# Patient Record
Sex: FEMALE | Race: ASIAN | NOT HISPANIC OR LATINO
[De-identification: names, ages, dates, MRNs, and addresses within clinical notes are randomized per-mention and may not be internally consistent; named-entity substitution may affect disease eponyms.]

---

## 2020-08-10 ENCOUNTER — ERX REFILL RESPONSE (OUTPATIENT)
Age: 55
End: 2020-08-10

## 2020-08-10 RX ORDER — OMEPRAZOLE 40 MG/1
TAKE ONE CAPSULE BY MOUTH TWICE A DAY CAPSULE, DELAYED RELEASE ORAL
Qty: 60 | Refills: 1

## 2020-09-16 ENCOUNTER — OFFICE VISIT (OUTPATIENT)
Dept: URBAN - METROPOLITAN AREA TELEHEALTH 2 | Facility: TELEHEALTH | Age: 55
End: 2020-09-16
Payer: COMMERCIAL

## 2020-09-16 ENCOUNTER — TELEPHONE ENCOUNTER (OUTPATIENT)
Dept: URBAN - METROPOLITAN AREA CLINIC 92 | Facility: CLINIC | Age: 55
End: 2020-09-16

## 2020-09-16 DIAGNOSIS — R63.0 ANOREXIA: ICD-10-CM

## 2020-09-16 DIAGNOSIS — R11.0 NAUSEA: ICD-10-CM

## 2020-09-16 DIAGNOSIS — R12 HEARTBURN: ICD-10-CM

## 2020-09-16 DIAGNOSIS — R10.84 ABDOMINAL PAIN, GENERALIZED: ICD-10-CM

## 2020-09-16 PROBLEM — 79890006 ANOREXIA: Status: ACTIVE | Noted: 2020-09-16

## 2020-09-16 PROCEDURE — 99214 OFFICE O/P EST MOD 30 MIN: CPT | Performed by: INTERNAL MEDICINE

## 2020-09-16 PROCEDURE — G8427 DOCREV CUR MEDS BY ELIG CLIN: HCPCS | Performed by: INTERNAL MEDICINE

## 2020-09-16 PROCEDURE — 1036F TOBACCO NON-USER: CPT | Performed by: INTERNAL MEDICINE

## 2020-09-16 PROCEDURE — 3017F COLORECTAL CA SCREEN DOC REV: CPT | Performed by: INTERNAL MEDICINE

## 2020-09-16 PROCEDURE — G9903 PT SCRN TBCO ID AS NON USER: HCPCS | Performed by: INTERNAL MEDICINE

## 2020-09-16 RX ORDER — OMEPRAZOLE 40 MG/1
TAKE ONE CAPSULE BY MOUTH TWICE A DAY CAPSULE, DELAYED RELEASE ORAL
Qty: 60 | Refills: 1 | Status: ACTIVE | COMMUNITY

## 2020-09-16 NOTE — HPI-TODAY'S VISIT:
Ms Gupta presents for a f/u visit. She complains of epigastric pain which is new. Describes this as a 'hunger' pain and burning sensation. She is on Omeprazole daily for heartburn but this doesn not help her pain. Also experiencing nausea and lack of appetite. She had reported some unintentional weight loss as well during her previous visit a few months ago but this has stabilized. No NSAID use. EGD 1 year ago showed gastritis. Colonoscopy in  was normal, next due in . Patient seen today via telehealth by agreement and consent of patient in light of current COVID-19 pandemic. I used video conferencing during the visit. The patient encounter is appropriate and reasonable under the circumstances given the patient's particular presentation at this time. The patient has been advised of the followin) the potential risks and limitations of this mode of treatment (including but not limited to the absence of in-person examination); 2) the right to refuse telehealth services at any point without affecting the right to future care; 3) the right to receive in-person services, included immediately after this consultation if an urgent need arises; 4) information, including identifiable images or information from this telehealth consult, will only be shared in accordance with HIPAA regulations. Any and all of the patient's and/or patient's family member's questions on this issue have been answered. The patient has verbally consented to be treated via telehealth services. The patient has also been advised to contact this office for worsening conditions or problems, and seek emergency medical treatment and/or call 911 if the patient deems either necessary.  More than half of the face-to-face time used for counseling and coordination of care.

## 2020-10-01 ENCOUNTER — TELEPHONE ENCOUNTER (OUTPATIENT)
Dept: URBAN - METROPOLITAN AREA CLINIC 92 | Facility: CLINIC | Age: 55
End: 2020-10-01

## 2020-10-01 ENCOUNTER — OFFICE VISIT (OUTPATIENT)
Dept: URBAN - METROPOLITAN AREA SURGERY CENTER 20 | Facility: SURGERY CENTER | Age: 55
End: 2020-10-01
Payer: COMMERCIAL

## 2020-10-01 DIAGNOSIS — K29.30 CHRONIC SUPERFICIAL GASTRITIS: ICD-10-CM

## 2020-10-01 PROCEDURE — G8907 PT DOC NO EVENTS ON DISCHARG: HCPCS | Performed by: INTERNAL MEDICINE

## 2020-10-01 PROCEDURE — 43239 EGD BIOPSY SINGLE/MULTIPLE: CPT | Performed by: INTERNAL MEDICINE

## 2020-10-01 RX ORDER — OMEPRAZOLE 40 MG/1
TAKE ONE CAPSULE BY MOUTH TWICE A DAY CAPSULE, DELAYED RELEASE ORAL
Qty: 60 | Refills: 1 | Status: ACTIVE | COMMUNITY

## 2020-10-08 ENCOUNTER — ERX REFILL RESPONSE (OUTPATIENT)
Age: 55
End: 2020-10-08

## 2020-10-08 RX ORDER — OMEPRAZOLE 40 MG/1
TAKE ONE CAPSULE BY MOUTH TWICE A DAY CAPSULE, DELAYED RELEASE ORAL
Qty: 60 | Refills: 2

## 2020-11-04 ENCOUNTER — LAB OUTSIDE AN ENCOUNTER (OUTPATIENT)
Dept: URBAN - METROPOLITAN AREA TELEHEALTH 2 | Facility: TELEHEALTH | Age: 55
End: 2020-11-04

## 2020-11-04 ENCOUNTER — OFFICE VISIT (OUTPATIENT)
Dept: URBAN - METROPOLITAN AREA TELEHEALTH 2 | Facility: TELEHEALTH | Age: 55
End: 2020-11-04
Payer: COMMERCIAL

## 2020-11-04 DIAGNOSIS — R10.13 DYSPEPSIA: ICD-10-CM

## 2020-11-04 DIAGNOSIS — R94.5 ABNORMAL LFTS: ICD-10-CM

## 2020-11-04 PROCEDURE — G8418 CALC BMI BLW LOW PARAM F/U: HCPCS | Performed by: INTERNAL MEDICINE

## 2020-11-04 PROCEDURE — 3017F COLORECTAL CA SCREEN DOC REV: CPT | Performed by: INTERNAL MEDICINE

## 2020-11-04 PROCEDURE — G9903 PT SCRN TBCO ID AS NON USER: HCPCS | Performed by: INTERNAL MEDICINE

## 2020-11-04 PROCEDURE — 1036F TOBACCO NON-USER: CPT | Performed by: INTERNAL MEDICINE

## 2020-11-04 PROCEDURE — 99214 OFFICE O/P EST MOD 30 MIN: CPT | Performed by: INTERNAL MEDICINE

## 2020-11-04 RX ORDER — OMEPRAZOLE 40 MG/1
TAKE ONE CAPSULE BY MOUTH TWICE A DAY CAPSULE, DELAYED RELEASE ORAL
Qty: 60 | Refills: 2 | Status: ACTIVE | COMMUNITY

## 2020-11-04 NOTE — HPI-TODAY'S VISIT:
Ms Gupta returns for a f/u visit. Reports improvement in her symptoms with Omeprazole daily. Recent CT and EGD were unremarkable.  Colonoscopy in 2019 was unremarkable. Next due in . Reports recent labs during a routine physical showed an ALT of 51.  Cholesterol levels were also mildly elevated.  No family history of liver disease. No recent new medications or supplements. No alcohol use. Patient seen today via telehealth by agreement and consent of patient in light of current COVID-19 pandemic. I used video conferencing during the visit. The patient encounter is appropriate and reasonable under the circumstances given the patient's particular presentation at this time. The patient has been advised of the followin) the potential risks and limitations of this mode of treatment (including but not limited to the absence of in-person examination); 2) the right to refuse telehealth services at any point without affecting the right to future care; 3) the right to receive in-person services, included immediately after this consultation if an urgent need arises; 4) information, including identifiable images or information from this telehealth consult, will only be shared in accordance with HIPAA regulations. Any and all of the patient's and/or patient's family member's questions on this issue have been answered. The patient has verbally consented to be treated via telehealth services. The patient has also been advised to contact this office for worsening conditions or problems, and seek emergency medical treatment and/or call 911 if the patient deems either necessary.  More than half of the face-to-face time used for counseling and coordination of care.

## 2022-01-04 ENCOUNTER — OFFICE VISIT (OUTPATIENT)
Dept: URBAN - METROPOLITAN AREA CLINIC 46 | Facility: CLINIC | Age: 57
End: 2022-01-04
Payer: COMMERCIAL

## 2022-01-04 VITALS
WEIGHT: 96.8 LBS | TEMPERATURE: 98.3 F | HEART RATE: 82 BPM | DIASTOLIC BLOOD PRESSURE: 81 MMHG | SYSTOLIC BLOOD PRESSURE: 141 MMHG | HEIGHT: 60 IN | BODY MASS INDEX: 19.01 KG/M2

## 2022-01-04 DIAGNOSIS — R10.13 DYSPEPSIA: ICD-10-CM

## 2022-01-04 PROCEDURE — 99204 OFFICE O/P NEW MOD 45 MIN: CPT | Performed by: INTERNAL MEDICINE

## 2022-01-04 RX ORDER — PANTOPRAZOLE SODIUM 40 MG/1
1 TABLET TABLET, DELAYED RELEASE ORAL ONCE A DAY
Qty: 90 | Refills: 3 | OUTPATIENT
Start: 2022-01-04

## 2022-01-04 RX ORDER — OMEPRAZOLE 40 MG/1
TAKE ONE CAPSULE BY MOUTH TWICE A DAY CAPSULE, DELAYED RELEASE ORAL
Qty: 60 | Refills: 2 | Status: DISCONTINUED | COMMUNITY

## 2022-01-04 RX ORDER — CIDER VINEGAR 300 MG
1 CAPSULE TABLET ORAL ONCE A DAY
Status: ACTIVE | COMMUNITY

## 2022-01-04 RX ORDER — OMEPRAZOLE 20 MG/1
1 CAPSULE 30 MINUTES BEFORE MORNING MEAL CAPSULE, DELAYED RELEASE ORAL ONCE A DAY
Status: ACTIVE | COMMUNITY

## 2022-01-04 RX ORDER — LISINOPRIL 2.5 MG/1
TAKE ONE TABLET BY MOUTH ONE TIME DAILY TABLET ORAL
Qty: 90 | Refills: 0 | Status: ACTIVE | COMMUNITY

## 2022-01-04 RX ORDER — ERGOCALCIFEROL (VITAMIN D2) 10 MCG
2 TABLETS TABLET ORAL ONCE A DAY
Status: ACTIVE | COMMUNITY

## 2022-01-04 RX ORDER — OMEPRAZOLE 20 MG/1
1 CAPSULE 30 MINUTES BEFORE MORNING MEAL CAPSULE, DELAYED RELEASE ORAL ONCE A DAY
OUTPATIENT

## 2022-01-04 RX ORDER — LATANOPROST 50 UG/ML
SOLUTION/ DROPS OPHTHALMIC
Qty: 7.5 | Status: ACTIVE | COMMUNITY

## 2022-01-04 NOTE — HPI-TODAY'S VISIT:
Pt with a hx of chronic epigastric pain with heartburn. Workup in 2019 by Dr. Burt with a negative EGD, colonoscopy and CT; started on Omeparzole with improvement in symptoms. Recurrent symptoms in 2020 and had a repat EGD and CT and negative per pt and took BID PPI for a short period of time and improved. 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 Since flare back to baseline where at times mild dyspepsia. No weight loss. No dysphagia. No fever or night sweats. No blood in the stool.

## 2022-03-08 ENCOUNTER — OFFICE VISIT (OUTPATIENT)
Dept: URBAN - METROPOLITAN AREA CLINIC 48 | Facility: CLINIC | Age: 57
End: 2022-03-08
Payer: COMMERCIAL

## 2022-03-08 VITALS
SYSTOLIC BLOOD PRESSURE: 136 MMHG | TEMPERATURE: 97.3 F | WEIGHT: 97 LBS | HEART RATE: 77 BPM | BODY MASS INDEX: 19.04 KG/M2 | HEIGHT: 60 IN | DIASTOLIC BLOOD PRESSURE: 73 MMHG

## 2022-03-08 DIAGNOSIS — R10.13 DYSPEPSIA: ICD-10-CM

## 2022-03-08 PROCEDURE — 99213 OFFICE O/P EST LOW 20 MIN: CPT | Performed by: INTERNAL MEDICINE

## 2022-03-08 PROCEDURE — 99213 OFFICE O/P EST LOW 20 MIN: CPT | Performed by: PHYSICIAN ASSISTANT

## 2022-03-08 RX ORDER — PANTOPRAZOLE SODIUM 40 MG/1
1 TABLET TABLET, DELAYED RELEASE ORAL ONCE A DAY
OUTPATIENT
Start: 2022-01-04

## 2022-03-08 RX ORDER — LISINOPRIL 2.5 MG/1
TAKE ONE TABLET BY MOUTH ONE TIME DAILY TABLET ORAL
Qty: 90 | Refills: 0 | Status: ACTIVE | COMMUNITY

## 2022-03-08 RX ORDER — PANTOPRAZOLE SODIUM 40 MG/1
1 TABLET TABLET, DELAYED RELEASE ORAL ONCE A DAY
Qty: 90 | Refills: 3 | Status: ACTIVE | COMMUNITY
Start: 2022-01-04

## 2022-03-08 NOTE — HPI-TODAY'S VISIT:
Pt presents for a 2 month f/u. She has a hx of chronic epigastric pain with heartburn. Workup in 2019 by Dr. Burt with a negative EGD, colonoscopy and CT; started on Omeparzole with improvement in symptoms. Recurrent symptoms in 2020 and had a repat EGD and CT and negative per pt and took BID PPI for a short period of time and improved. 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 No weight loss. No dysphagia. No fever or night sweats. No blood in the stool. She was changed from Omeprazole 20 mg to Pantoprazole 40 mg daily. She returns today and does feel significantly improved. She still has occasional dyspepsia with mild upper abdominal discomfort and "sour" feeling in her stomach, which usually occurs a few hours after eating. Certain foods do seem to be triggering. She denies any significant abdominal pain.

## 2022-09-12 ENCOUNTER — OFFICE VISIT (OUTPATIENT)
Dept: URBAN - METROPOLITAN AREA CLINIC 48 | Facility: CLINIC | Age: 57
End: 2022-09-12
Payer: COMMERCIAL

## 2022-09-12 VITALS
WEIGHT: 100.2 LBS | HEART RATE: 81 BPM | HEIGHT: 60 IN | BODY MASS INDEX: 19.67 KG/M2 | TEMPERATURE: 98.1 F | DIASTOLIC BLOOD PRESSURE: 74 MMHG | SYSTOLIC BLOOD PRESSURE: 123 MMHG

## 2022-09-12 DIAGNOSIS — R10.13 DYSPEPSIA: ICD-10-CM

## 2022-09-12 PROCEDURE — 99213 OFFICE O/P EST LOW 20 MIN: CPT | Performed by: INTERNAL MEDICINE

## 2022-09-12 RX ORDER — LISINOPRIL 2.5 MG/1
TAKE ONE TABLET BY MOUTH ONE TIME DAILY TABLET ORAL
Qty: 90 | Refills: 0 | Status: ACTIVE | COMMUNITY

## 2022-09-12 RX ORDER — PANTOPRAZOLE SODIUM 40 MG/1
1 TABLET TABLET, DELAYED RELEASE ORAL ONCE A DAY
Status: ACTIVE | COMMUNITY
Start: 2022-01-04

## 2022-09-12 NOTE — HPI-TODAY'S VISIT:
Pt presents for a 6 month f/u. She has a hx of chronic epigastric pain with heartburn. Workup in 2019 by Dr. Burt with a negative EGD, colonoscopy and CT; started on Omeprazole with improvement in symptoms. Recurrent symptoms in 2020 and had a repeat EGD and CT and negative per pt and took BID PPI for a short period of time and improved. 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 No weight loss. No dysphagia. No fever or night sweats. No blood in the stool. She was changed from Omeprazole 20 mg to Pantoprazole 40 mg daily.Last seen in March and feeling significantly improved. She still has occasional dyspepsia with mild upper abdominal discomfort and "sour" feeling in her stomach, which usually occurs a few hours after eating. Certain foods do seem to be triggering. She denies any significant abdominal pain. She returns today with no significant changes. Overall, she feels well, and occasionally has the "sour" stomach, still felt to be related to certain foods. She is still on Pantoprazole 40 mg daily and did not really try taking FDgard PRN. No new concerns. She is on a Calcium and Vit D supplement daily.

## 2022-12-27 ENCOUNTER — ERX REFILL RESPONSE (OUTPATIENT)
Dept: URBAN - METROPOLITAN AREA CLINIC 46 | Facility: CLINIC | Age: 57
End: 2022-12-27

## 2022-12-27 RX ORDER — PANTOPRAZOLE 40 MG/1
TAKE ONE TABLET BY MOUTH DAILY TABLET, DELAYED RELEASE ORAL ONCE A DAY
Qty: 90 TABLET | Refills: 0 | OUTPATIENT

## 2022-12-27 RX ORDER — PANTOPRAZOLE SODIUM 40 MG/1
1 TABLET TABLET, DELAYED RELEASE ORAL ONCE A DAY
Qty: 90 TABLET | Refills: 3 | OUTPATIENT

## 2023-03-13 ENCOUNTER — OFFICE VISIT (OUTPATIENT)
Dept: URBAN - METROPOLITAN AREA CLINIC 48 | Facility: CLINIC | Age: 58
End: 2023-03-13
Payer: COMMERCIAL

## 2023-03-13 VITALS
TEMPERATURE: 97.9 F | WEIGHT: 101 LBS | BODY MASS INDEX: 19.83 KG/M2 | SYSTOLIC BLOOD PRESSURE: 129 MMHG | OXYGEN SATURATION: 98 % | HEART RATE: 74 BPM | HEIGHT: 60 IN | DIASTOLIC BLOOD PRESSURE: 83 MMHG

## 2023-03-13 DIAGNOSIS — R10.13 DYSPEPSIA: ICD-10-CM

## 2023-03-13 PROCEDURE — 99213 OFFICE O/P EST LOW 20 MIN: CPT | Performed by: INTERNAL MEDICINE

## 2023-03-13 RX ORDER — CALCIUM CARBONATE/VITAMIN D3 500-10/5ML
1 CAPSULE LIQUID (ML) ORAL ONCE A DAY
Status: ACTIVE | COMMUNITY

## 2023-03-13 RX ORDER — LISINOPRIL 2.5 MG/1
TAKE ONE TABLET BY MOUTH ONE TIME DAILY TABLET ORAL
Qty: 90 | Refills: 0 | Status: ACTIVE | COMMUNITY

## 2023-03-13 RX ORDER — CYANOCOBALAMIN (VITAMIN B-12) 500 MCG
1 CAPSULE TABLET ORAL TWICE A DAY
Status: ACTIVE | COMMUNITY

## 2023-03-13 RX ORDER — PANTOPRAZOLE SODIUM 40 MG/1
1 TABLET TABLET, DELAYED RELEASE ORAL ONCE A DAY
Qty: 90 TABLET | Refills: 3 | Status: ACTIVE | COMMUNITY

## 2023-03-13 NOTE — HPI-TODAY'S VISIT:
Pt presents for a 6 month f/u on chronic dyspepsia. Workup in 2019 by Dr. Burt with a negative EGD, colonoscopy and CT; started on Omeprazole with improvement in symptoms. Recurrent symptoms in 2020 and had a repeat EGD and CT and negative per pt and took BID PPI for a short period of time and improved. 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 She was changed from Omeprazole 20 mg to Pantoprazole 40 mg daily. Followed up in March 2022 and was significantly improved; still with occasional dyspepsia with mild upper abdominal discomfort and "sour" feeling in her stomach, which usually occurs a few hours after eating. Followed up again 9/2022 with no reported change; still on Pantoprazole 40 mg daily as well as Calcium and Vit D supplement daily.  Returns today 3/13/23 and no significant change. She has been taking Pantoprazole 40 mg qod and avoiding triggering foods (i.e. mentions had gas/bloating/sour stomach after eating cabbage recently). Certain foods also result in belching after meals. She tries to eat small portion sizes. Denies early satiety, N/V. Appetite is good and weight is stable.  She is not diabetic and does not take pain medication. She has an upcoming physical with her PCP.

## 2024-03-04 ENCOUNTER — OV EP (OUTPATIENT)
Dept: URBAN - METROPOLITAN AREA CLINIC 48 | Facility: CLINIC | Age: 59
End: 2024-03-04
Payer: COMMERCIAL

## 2024-03-04 VITALS
DIASTOLIC BLOOD PRESSURE: 77 MMHG | HEIGHT: 60 IN | HEART RATE: 73 BPM | BODY MASS INDEX: 19.44 KG/M2 | WEIGHT: 99 LBS | TEMPERATURE: 97.7 F | SYSTOLIC BLOOD PRESSURE: 121 MMHG | OXYGEN SATURATION: 99 %

## 2024-03-04 DIAGNOSIS — R10.13 DYSPEPSIA: ICD-10-CM

## 2024-03-04 PROCEDURE — 99213 OFFICE O/P EST LOW 20 MIN: CPT | Performed by: INTERNAL MEDICINE

## 2024-03-04 RX ORDER — CALCIUM CARBONATE/VITAMIN D3 500-10/5ML
1 CAPSULE LIQUID (ML) ORAL ONCE A DAY
Status: ACTIVE | COMMUNITY

## 2024-03-04 RX ORDER — CYANOCOBALAMIN (VITAMIN B-12) 500 MCG
1 CAPSULE TABLET ORAL TWICE A DAY
Status: ACTIVE | COMMUNITY

## 2024-03-04 RX ORDER — PANTOPRAZOLE SODIUM 40 MG/1
1 TABLET TABLET, DELAYED RELEASE ORAL ONCE A DAY
Qty: 30 | Refills: 6

## 2024-03-04 RX ORDER — LISINOPRIL 2.5 MG/1
TAKE ONE TABLET BY MOUTH ONE TIME DAILY TABLET ORAL
Qty: 90 | Refills: 0 | Status: ACTIVE | COMMUNITY

## 2024-03-04 RX ORDER — PANTOPRAZOLE SODIUM 40 MG/1
1 TABLET TABLET, DELAYED RELEASE ORAL ONCE A DAY
Qty: 90 TABLET | Refills: 3 | Status: ACTIVE | COMMUNITY

## 2024-03-04 NOTE — PHYSICAL EXAM HENT:
Head, normocephalic, atraumatic, Face, Face within normal limits, Ears, External ears within normal limits 4 (moderate pain)

## 2024-03-04 NOTE — HPI-TODAY'S VISIT:
Pt presents for a 6 month f/u on chronic dyspepsia. Workup in 2019 by Dr. Burt with a negative EGD, colonoscopy and CT; started on Omeprazole with improvement in symptoms. Recurrent symptoms in 2020 and had a repeat EGD and CT and negative per pt and took BID PPI for a short period of time and improved. 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 She was changed from Omeprazole 20 mg to Pantoprazole 40 mg daily. Followed up in March 2022 and was significantly improved; still with occasional dyspepsia with mild upper abdominal discomfort and "sour" feeling in her stomach, which usually occurs a few hours after eating. Followed up again 9/2022 with no reported change; still on Pantoprazole 40 mg daily as well as Calcium and Vit D supplement daily.  Returns today 3/13/23 and no significant change. She has been taking Pantoprazole 40 mg qod and avoiding triggering foods (i.e. mentions had gas/bloating/sour stomach after eating cabbage recently). Certain foods also result in belching after meals. She tries to eat small portion sizes. Denies early satiety, N/V. Appetite is good and weight is stable.  She is not diabetic and does not take pain medication. She has an upcoming physical with her PCP. We discussed long term PPI use. We discussed continuing PPI at lowest effective amount, FDgard and/or gasex PRN; NM GE study if symptoms were to worsen.  3/4/24 for annual follow up: She has done very well with minimal episodes of dyspepsia over the last year. However, she has had some upper abdominal pain in the last 3 days; unsure if something she ate triggered this. She gets relief if she belches. Denies NSAID use. She has been taking Pantoprazole 40 mg once every 5 days. She has had improvement with PRN FDgard. No other new concerns.

## 2025-02-26 ENCOUNTER — OFFICE VISIT (OUTPATIENT)
Dept: URBAN - METROPOLITAN AREA CLINIC 48 | Facility: CLINIC | Age: 60
End: 2025-02-26

## 2025-03-04 ENCOUNTER — OFFICE VISIT (OUTPATIENT)
Dept: URBAN - METROPOLITAN AREA CLINIC 48 | Facility: CLINIC | Age: 60
End: 2025-03-04

## 2025-03-04 ENCOUNTER — DASHBOARD ENCOUNTERS (OUTPATIENT)
Age: 60
End: 2025-03-04

## 2025-03-10 ENCOUNTER — OFFICE VISIT (OUTPATIENT)
Dept: URBAN - METROPOLITAN AREA CLINIC 48 | Facility: CLINIC | Age: 60
End: 2025-03-10
Payer: COMMERCIAL

## 2025-03-10 VITALS
BODY MASS INDEX: 20.38 KG/M2 | TEMPERATURE: 97 F | DIASTOLIC BLOOD PRESSURE: 80 MMHG | WEIGHT: 103.8 LBS | SYSTOLIC BLOOD PRESSURE: 177 MMHG | HEIGHT: 60 IN | HEART RATE: 71 BPM

## 2025-03-10 DIAGNOSIS — R10.13 DYSPEPSIA: ICD-10-CM

## 2025-03-10 PROCEDURE — 99213 OFFICE O/P EST LOW 20 MIN: CPT | Performed by: PHYSICIAN ASSISTANT

## 2025-03-10 RX ORDER — PANTOPRAZOLE SODIUM 40 MG/1
1 TABLET TABLET, DELAYED RELEASE ORAL ONCE A DAY
Qty: 90 TABLET | Refills: 3

## 2025-03-10 RX ORDER — CYANOCOBALAMIN (VITAMIN B-12) 500 MCG
1 CAPSULE TABLET ORAL TWICE A DAY
Status: ACTIVE | COMMUNITY

## 2025-03-10 RX ORDER — CALCIUM CARBONATE/VITAMIN D3 500-10/5ML
1 CAPSULE LIQUID (ML) ORAL ONCE A DAY
Status: ACTIVE | COMMUNITY

## 2025-03-10 RX ORDER — LISINOPRIL 2.5 MG/1
1 TABLET TABLET ORAL ONCE A DAY
Refills: 0 | Status: ACTIVE | COMMUNITY

## 2025-03-10 RX ORDER — PANTOPRAZOLE SODIUM 40 MG/1
1 TABLET TABLET, DELAYED RELEASE ORAL ONCE A DAY
Qty: 30 | Refills: 6 | Status: ACTIVE | COMMUNITY

## 2025-03-10 NOTE — HPI-TODAY'S VISIT:
Pt presents for an annual f/u on chronic dyspepsia. Workup in 2019 by Dr. Burt with a negative EGD (unremarkable esophageal, gastric SB Bx), negative colonoscopy (no family h/o CRC or polyps) and negative CT; started on Omeprazole with improvement in symptoms. Recurrent symptoms in 2020 and had a repeat EGD and CT and negative per pt and took BID PPI for a short period of time and improved. She previously took Omeprazole, and had overall improvement when changed to Pantoprazole. When seen last year, she was taking this 5 days per week with stable symptoms. Today, she reports she is only needing to take it about once a week and doing well. She feels certain foods bring on her epigastric discomfort. She thinks she is lactose intolerant and has been using Lactaid milk. She denies change in pain, N/V, abnormal weight loss, or blood in the stool. Labs 4/2024 showed a normal Cr and Calcium.